# Patient Record
Sex: FEMALE | URBAN - METROPOLITAN AREA
[De-identification: names, ages, dates, MRNs, and addresses within clinical notes are randomized per-mention and may not be internally consistent; named-entity substitution may affect disease eponyms.]

---

## 2020-01-24 ENCOUNTER — TELEPHONE (OUTPATIENT)
Dept: TRANSPLANT | Facility: CLINIC | Age: 30
End: 2020-01-24

## 2020-01-24 NOTE — TELEPHONE ENCOUNTER
"SW consult note:  SW received staff message regarding pt's compliance with dialysis. Patient was referred for a kidney transplant.     TYRA spoke with TYRA mota at pt's dialysis unit. Silva reports that patient has been with her dialysis unit since 2006. In 2016, patient was transplanted at [facility] and Silva believe she only kept the kidney for about a year before returning to dialysis. Silva does not know why she lost the kidney, but when txp SW ask if she thought it was due to non-compliance Silva states "I wouldn't rule that out, it is possible".     Silva has known this patient since she started dialysis in 2006 and is very familiar with her case. Silva reports that patient does have a history of non-compliance. Pt would miss dialysis treatment, not reschedule, not take her binders correctly, and up until about 3 months ago was still doing so. Silva state patient doesn't have much family support. Silva reports she lives with her grandmother and has her sister and mother. Pt did have reliable transportation, but she is now back using Medicaid transportation because her car needs to be fixed.     Patient was on home hemo for a few years, but had to be taken off because she was having too many fluid crisis'. When SW asked Silva if the pt was either doing it intentionally or did not understand what was happening she states "I think she was scared to do it herself, so she just wouldn't". Evelinajessica reports pt also had open heart surgery 3 years ago.     TYRA asked about pt's current labs and she reports January labs show pt's PTH is 1179.    TYRA explained in detail that from a social perspective, pt will need to show at least 6 month of compliance with medication, dialysis treatments and have reliable transportation before being referred to transplant again. TYRA explained that even though pt has been "compliant" for 3 months, due to pt's history of non-compliance that she will need to continue to maintain compliance. TYRA explained to Sliva that " "pt's PTH is also over our centers limits of acceptable PTH levels and to not refer pt until her PTH is under 1000. Silva reports that pt's PTH have been controlled and all of a sudden it's above 1000 again. TYRA will relay that information to the nurse for further review. Silva asked TYRA if there "were test that we could do to see if there was anything she would need". SW ask Silva to elaborate. Silva states "I don't want to refer her again for her to only find out she can't get transplant because she too calcified". TYRA will reach out to referral nurse for more information about this. TYRA explained to Silva again that from a social perspective, patient is not a candidate at this.     SW remains available.    "